# Patient Record
Sex: MALE | Race: WHITE | NOT HISPANIC OR LATINO
[De-identification: names, ages, dates, MRNs, and addresses within clinical notes are randomized per-mention and may not be internally consistent; named-entity substitution may affect disease eponyms.]

---

## 2022-11-03 ENCOUNTER — LABORATORY RESULT (OUTPATIENT)
Age: 62
End: 2022-11-03

## 2022-11-03 ENCOUNTER — APPOINTMENT (OUTPATIENT)
Dept: INTERNAL MEDICINE | Facility: CLINIC | Age: 62
End: 2022-11-03

## 2022-11-03 VITALS
TEMPERATURE: 97.4 F | HEART RATE: 102 BPM | HEIGHT: 73 IN | SYSTOLIC BLOOD PRESSURE: 154 MMHG | OXYGEN SATURATION: 97 % | DIASTOLIC BLOOD PRESSURE: 86 MMHG | BODY MASS INDEX: 31.01 KG/M2 | WEIGHT: 234 LBS

## 2022-11-03 DIAGNOSIS — Z23 ENCOUNTER FOR IMMUNIZATION: ICD-10-CM

## 2022-11-03 DIAGNOSIS — M10.9 GOUT, UNSPECIFIED: ICD-10-CM

## 2022-11-03 DIAGNOSIS — I10 ESSENTIAL (PRIMARY) HYPERTENSION: ICD-10-CM

## 2022-11-03 DIAGNOSIS — Z00.00 ENCOUNTER FOR GENERAL ADULT MEDICAL EXAMINATION W/OUT ABNORMAL FINDINGS: ICD-10-CM

## 2022-11-03 DIAGNOSIS — I25.10 ATHEROSCLEROTIC HEART DISEASE OF NATIVE CORONARY ARTERY W/OUT ANGINA PECTORIS: ICD-10-CM

## 2022-11-03 DIAGNOSIS — J43.9 EMPHYSEMA, UNSPECIFIED: ICD-10-CM

## 2022-11-03 DIAGNOSIS — I25.84 ATHEROSCLEROTIC HEART DISEASE OF NATIVE CORONARY ARTERY W/OUT ANGINA PECTORIS: ICD-10-CM

## 2022-11-03 PROCEDURE — 36415 COLL VENOUS BLD VENIPUNCTURE: CPT

## 2022-11-03 PROCEDURE — 99204 OFFICE O/P NEW MOD 45 MIN: CPT | Mod: 25

## 2022-11-03 PROCEDURE — 90732 PPSV23 VACC 2 YRS+ SUBQ/IM: CPT

## 2022-11-03 PROCEDURE — G0009: CPT

## 2022-11-03 NOTE — HISTORY OF PRESENT ILLNESS
[Other: _____] : [unfilled] [FreeTextEntry1] : np establish care,offers  [de-identified] :  Pt feels better at the time of exam, no fever , shaking chills no nausea, no vomiting, no diarrhoea, no constipation, no chest pain, no palpitations.Denies any cough or any other respiratory s/s.Not loosing lot of weight nor gaining excess amount of weight.No muscle aches, no joint pains, no skin rashes.No forgetfulness no difficulty with ADLs, Able to sleep, eat ,function and eliminate well.Not been to an emergency room or hospital in the last one year. vaccinated for covid with both vaccinations.offers no complaints.\par had

## 2022-11-03 NOTE — HEALTH RISK ASSESSMENT
[Very Good] : ~his/her~  mood as very good [Former] : Former [20 or more] : 20 or more [Yes] : Yes [0] : 2) Feeling down, depressed, or hopeless: Not at all (0) [PHQ-2 Negative - No further assessment needed] : PHQ-2 Negative - No further assessment needed [No Retinopathy] : No retinopathy [No falls in past year] : Patient reported no falls in the past year [HIV test declined] : HIV test declined [Hepatitis C test declined] : Hepatitis C test declined [] :  [Feels Safe at Home] : Feels safe at home [Fully functional (bathing, dressing, toileting, transferring, walking, feeding)] : Fully functional (bathing, dressing, toileting, transferring, walking, feeding) [Fully functional (using the telephone, shopping, preparing meals, housekeeping, doing laundry, using] : Fully functional and needs no help or supervision to perform IADLs (using the telephone, shopping, preparing meals, housekeeping, doing laundry, using transportation, managing medications and managing finances) [Reviewed no changes] : Reviewed, no changes [I will adhere to the patient's wishes.] : I will adhere to the patient's wishes. [1] : 1 [de-identified] : None [de-identified] : 3 to 4 packs of cigarettes over 36 years.  Quit 3 months ago [YearQuit] : 2022 [AVR0Festw] : 0 [LowDoseCTScan] : 01/22/2022 [Change in mental status noted] : No change in mental status noted [Reports changes in hearing] : Reports no changes in hearing [MammogramComments] : Not applicable [PapSmearComments] : Not applicable [BoneDensityComments] : Not applicable [ColonoscopyComments] : Sent for colonoscopy and also fit test for stool [AdvancecareDate] : 11/3/2022 [FreeTextEntry4] : Patient does not want a healthcare proxy at this point

## 2022-11-03 NOTE — PLAN
[FreeTextEntry1] : Patient's BMI was documented and addressed with the patient.  Weight loss plan discussed with the patient in full.  \par  nutrition support provided nutritionist name offered to the patient.\par Exercise and weight reduction Rae risk reduction, explained to the patient.\par Diet low in simple carbohydrates, rich in fiber, no trans fat and low dietary fat, if possible plant based healthy fats.\par good source of proteins, both plant and animal sources, try to avoid red meats if possible.\par sleep is very important at least 8 hours of restful sleep, if possible afternoon brief siesta, for people above 60 years of age.\par Exercise, both aerobic and weight training.\par Healthy habits like gardening, walking etc, both as exercise and hobby\par wear seat belts,\par Immunizations please pay attention to the age, season etc.\par \par Patient's BMI was documented and addressed with the patient.  Weight loss plan discussed with the patient in full.  \par  nutrition support provided nutritionist name offered to the patient.\par Exercise and weight reduction risk reduction, explained to the patient.\par \par Avoid drugs, alcohol and refrain from smoking.\par Know your risk factors ,go for screening testing\par \par Keep the local area dry and clean for next 24 hours,\par Do not overexert with the muscular activity of the left upper extremity\par Cold fermentation or cold pack locally can help with the pain\par You may try Tylenol or Advil for mild to moderate pain relief\par You can observe for skin color changes if it is a red or a red line running from the injection site means there is an infection please call our office ASAP\par If you see any other major reactions systemically please go to the emergency room right away\par \par \par

## 2022-11-09 ENCOUNTER — APPOINTMENT (OUTPATIENT)
Dept: INTERNAL MEDICINE | Facility: CLINIC | Age: 62
End: 2022-11-09

## 2022-11-09 VITALS
WEIGHT: 230 LBS | HEIGHT: 73 IN | TEMPERATURE: 97.3 F | SYSTOLIC BLOOD PRESSURE: 140 MMHG | OXYGEN SATURATION: 98 % | HEART RATE: 86 BPM | DIASTOLIC BLOOD PRESSURE: 86 MMHG | BODY MASS INDEX: 30.48 KG/M2

## 2022-11-09 DIAGNOSIS — R80.9 PROTEINURIA, UNSPECIFIED: ICD-10-CM

## 2022-11-09 DIAGNOSIS — R79.89 OTHER SPECIFIED ABNORMAL FINDINGS OF BLOOD CHEMISTRY: ICD-10-CM

## 2022-11-09 DIAGNOSIS — R73.09 OTHER ABNORMAL GLUCOSE: ICD-10-CM

## 2022-11-09 DIAGNOSIS — E78.00 PURE HYPERCHOLESTEROLEMIA, UNSPECIFIED: ICD-10-CM

## 2022-11-09 PROCEDURE — 99214 OFFICE O/P EST MOD 30 MIN: CPT

## 2022-11-09 NOTE — HISTORY OF PRESENT ILLNESS
[Other: _____] : [unfilled] [FreeTextEntry1] : pt is here for his follow up  [de-identified] : Patient came for the follow-up on his blood work results cholesterol was elevated in the form of triglycerides patient was advised to watch diet and exercise his hemoglobin A1c was elevated and liver function tests were elevated and there was protein in the urine\par \par  Pt feels better at the time of exam, no fever , shaking chills no nausea, no vomiting, no diarrhoea, no constipation, no chest pain, no palpitations.Denies any cough or any other respiratory s/s.Not loosing lot of weight nor gaining excess amount of weight.No muscle aches, no joint pains, no skin rashes.No forgetfulness no difficulty with ADLs, Able to sleep, eat ,function and eliminate well.Not been to an emergency room or hospital in the last one year. vaccinated for covid with both vaccinations.offers no complaints.\par

## 2022-11-09 NOTE — PLAN
[FreeTextEntry1] : Patient's BMI was documented and addressed with the patient.  Weight loss plan discussed with the patient in full.  \par  nutrition support provided nutritionist name offered to the patient.\par Exercise and weight reduction  risk reduction, explained to the patient.\par Pt. with stable hyperlipidemia at present, reports are compliance with the usage of the medication.Pt is reminded to take OTC coenzyme q10 100mg at night along with the statin /cholesterol medication, to help alleviate the side effects.Pt is aware that BW should be checked fasting on [id number] interval with lipids, CPK and LFTs. Pt reminded to keep BMI <25, daily exercise and to follow up with the office on timely fashion.A hyperlipidemia diet plan was discussed with the pt in a detail focusing on avoid the bad carbohydrates, white flours, trans hydrogenated fats, processed foods, corn starch/syrup and to follow a heather-gatherer diet of lean protein, green vegetables and high in antioxidants.\par \par Elevated liver function test could be due to excessive intake of colchicine, Naprosyn, other nonsteroidal anti-inflammatory agents.  Advised the patient to either discontinue or minimize the use of the above forementioned medications.  Also advised to not to drink alcohol because it can potentiate the problem of elevated liver function tests.  Patient advised to come back in 3 months to repeat blood work analysis\par \par \par

## 2022-11-10 LAB
24R-OH-CALCIDIOL SERPL-MCNC: 57 PG/ML
ALBUMIN SERPL ELPH-MCNC: 5.2 G/DL
ALP BLD-CCNC: 72 U/L
ALT SERPL-CCNC: 129 U/L
ANION GAP SERPL CALC-SCNC: 18 MMOL/L
APPEARANCE: CLEAR
AST SERPL-CCNC: 77 U/L
BASOPHILS # BLD AUTO: 0.08 K/UL
BASOPHILS NFR BLD AUTO: 0.9 %
BILIRUB SERPL-MCNC: 0.5 MG/DL
BILIRUBIN URINE: NEGATIVE
BLOOD URINE: NEGATIVE
BUN SERPL-MCNC: 16 MG/DL
CALCIUM SERPL-MCNC: 10.1 MG/DL
CHLORIDE SERPL-SCNC: 94 MMOL/L
CHOLEST SERPL-MCNC: 165 MG/DL
CO2 SERPL-SCNC: 25 MMOL/L
COLOR: YELLOW
CREAT SERPL-MCNC: 0.76 MG/DL
EGFR: 101 ML/MIN/1.73M2
EOSINOPHIL # BLD AUTO: 0.25 K/UL
EOSINOPHIL NFR BLD AUTO: 2.9 %
ESTIMATED AVERAGE GLUCOSE: 123 MG/DL
GLUCOSE QUALITATIVE U: NEGATIVE
GLUCOSE SERPL-MCNC: 102 MG/DL
HBA1C MFR BLD HPLC: 5.9 %
HCT VFR BLD CALC: 42.3 %
HDLC SERPL-MCNC: 60 MG/DL
HGB BLD-MCNC: 14.3 G/DL
IMM GRANULOCYTES NFR BLD AUTO: 0.9 %
KETONES URINE: ABNORMAL
LDLC SERPL CALC-MCNC: 51 MG/DL
LEUKOCYTE ESTERASE URINE: NEGATIVE
LYMPHOCYTES # BLD AUTO: 2.51 K/UL
LYMPHOCYTES NFR BLD AUTO: 29.5 %
MAN DIFF?: NORMAL
MCHC RBC-ENTMCNC: 31.9 PG
MCHC RBC-ENTMCNC: 33.8 GM/DL
MCV RBC AUTO: 94.4 FL
MONOCYTES # BLD AUTO: 0.56 K/UL
MONOCYTES NFR BLD AUTO: 6.6 %
NEUTROPHILS # BLD AUTO: 5.03 K/UL
NEUTROPHILS NFR BLD AUTO: 59.2 %
NITRITE URINE: NEGATIVE
NONHDLC SERPL-MCNC: 105 MG/DL
PH URINE: 7.5
PLATELET # BLD AUTO: 304 K/UL
POTASSIUM SERPL-SCNC: 4.4 MMOL/L
PROT SERPL-MCNC: 7.5 G/DL
PROTEIN URINE: ABNORMAL
PSA FREE FLD-MCNC: 14 %
PSA FREE SERPL-MCNC: 0.33 NG/ML
PSA SERPL-MCNC: 2.41 NG/ML
RBC # BLD: 4.48 M/UL
RBC # FLD: 13.2 %
SODIUM SERPL-SCNC: 137 MMOL/L
SPECIFIC GRAVITY URINE: 1.03
TRIGL SERPL-MCNC: 270 MG/DL
TSH SERPL-ACNC: 2.79 UIU/ML
URATE SERPL-MCNC: 5.5 MG/DL
UROBILINOGEN URINE: NORMAL
WBC # FLD AUTO: 8.51 K/UL

## 2023-01-30 ENCOUNTER — TRANSCRIPTION ENCOUNTER (OUTPATIENT)
Age: 63
End: 2023-01-30

## 2023-07-21 ENCOUNTER — APPOINTMENT (OUTPATIENT)
Dept: ORTHOPEDIC SURGERY | Facility: CLINIC | Age: 63
End: 2023-07-21
Payer: COMMERCIAL

## 2023-07-21 VITALS — HEIGHT: 73 IN | BODY MASS INDEX: 30.48 KG/M2 | WEIGHT: 230 LBS

## 2023-07-21 DIAGNOSIS — S46.012A STRAIN OF MUSCLE(S) AND TENDON(S) OF THE ROTATOR CUFF OF LEFT SHOULDER, INITIAL ENCOUNTER: ICD-10-CM

## 2023-07-21 DIAGNOSIS — Z78.9 OTHER SPECIFIED HEALTH STATUS: ICD-10-CM

## 2023-07-21 DIAGNOSIS — E78.00 PURE HYPERCHOLESTEROLEMIA, UNSPECIFIED: ICD-10-CM

## 2023-07-21 DIAGNOSIS — M75.52 BURSITIS OF LEFT SHOULDER: ICD-10-CM

## 2023-07-21 DIAGNOSIS — M75.22 BICIPITAL TENDINITIS, LEFT SHOULDER: ICD-10-CM

## 2023-07-21 DIAGNOSIS — I10 ESSENTIAL (PRIMARY) HYPERTENSION: ICD-10-CM

## 2023-07-21 DIAGNOSIS — Z87.891 PERSONAL HISTORY OF NICOTINE DEPENDENCE: ICD-10-CM

## 2023-07-21 PROCEDURE — 99204 OFFICE O/P NEW MOD 45 MIN: CPT

## 2023-07-21 PROCEDURE — 73030 X-RAY EXAM OF SHOULDER: CPT | Mod: LT

## 2023-07-21 RX ORDER — ROSUVASTATIN CALCIUM 20 MG/1
20 TABLET, FILM COATED ORAL
Refills: 0 | Status: ACTIVE | COMMUNITY

## 2023-07-21 RX ORDER — LISINOPRIL AND HYDROCHLOROTHIAZIDE 25; 20 MG/1; MG/1
25-20 TABLET ORAL
Refills: 0 | Status: ACTIVE | COMMUNITY

## 2023-07-21 RX ORDER — LISINOPRIL 10 MG/1
10 TABLET ORAL
Refills: 0 | Status: ACTIVE | COMMUNITY

## 2023-07-21 NOTE — DISCUSSION/SUMMARY
[de-identified] : LUE: + TTP LHBT\par pain with cuff testing\par \par This is a 64 yo male presenting to the office c/o ongoing left shoulder pain x one day\par Pain with motion overhead\par Xray demonstrates a calcium deposit (likley chronic)\par Rotator cuff strength intact on exam\par Advised if the pain gets worse consider Biceps injection \par Voltaren gel recommended \par Follow up 2 weeks if pain gets worse to consider MRI \par Instructed people there is a high probability of rotator cuff tear however we would not be able to determine this without MRI which I would recommend if patient is not improving.\par \par (1) We discussed a comprehensive treatment plans that included a prescription management plan involving the use of prescription strength medications to include Ibuprofen 600-800 mg TID, versus 500-650 mg Tylenol. We also discussed prescribing topical diclofenac (Voltaren gel) as well as once daily Meloxicam 15 mg.\par (2) The patient has More Than One chronic injuries/illnesses as outlined, discussed, and documented by ICD 10 codes listed, as well as the HPI and Plan section.\par There is a moderate risk of morbidity with further treatment, especially from use of prescription strength medications and possible side effects of these medications which include upset stomach and cardiac/renal issues with long term use were discussed.\par (3) I recommended that the patient follow-up with their medical physician to discuss any significant specific potential issues with long term use such as interactions with current medications or with exacerbation of underlying medical morbidities. \par \par Attestation:\par I, Anh SHEPPARD'Germania , attest that this documentation has been prepared under the direction and in the presence of Provider Milton Sheikh MD.\par The documentation recorded by the scribe, in my presence, accurately reflects the service I personally performed, and the decisions made by me with my edits as appropriate.\par Milton Sheikh MD\par \par

## 2023-07-21 NOTE — HISTORY OF PRESENT ILLNESS
[de-identified] : This is a 64 yo male presenting to the office c/o ongoing left shoulder pain x one day. Pt went behind his back with his shoulder to grab something and felt a pain.  Pain is described as constant, severe in quality. Currently pain is 8/10 and non-radiating. Patient reports pain has been getting progressively worse. Pain is worse at night. Overall pain does improve with rest and ice. Patient denies any numbness or tingling.\par ~Anterior and anterior lateral pain when reaching behind his back felt a pop.  No bruising or deformity.